# Patient Record
Sex: FEMALE | Race: WHITE | NOT HISPANIC OR LATINO | Employment: OTHER | URBAN - METROPOLITAN AREA
[De-identification: names, ages, dates, MRNs, and addresses within clinical notes are randomized per-mention and may not be internally consistent; named-entity substitution may affect disease eponyms.]

---

## 2017-03-31 ENCOUNTER — PREPPED CHART (OUTPATIENT)
Dept: URBAN - METROPOLITAN AREA CLINIC 43 | Facility: CLINIC | Age: 77
End: 2017-03-31

## 2018-03-30 ENCOUNTER — ESTABLISHED COMPREHENSIVE EXAM (OUTPATIENT)
Dept: URBAN - METROPOLITAN AREA CLINIC 43 | Facility: CLINIC | Age: 78
End: 2018-03-30

## 2018-03-30 DIAGNOSIS — H04.123: ICD-10-CM

## 2018-03-30 DIAGNOSIS — Z96.1: ICD-10-CM

## 2018-03-30 PROCEDURE — 1036F TOBACCO NON-USER: CPT

## 2018-03-30 PROCEDURE — 92014 COMPRE OPH EXAM EST PT 1/>: CPT

## 2018-03-30 PROCEDURE — G8756 NO BP MEASURE DOC: HCPCS

## 2018-03-30 PROCEDURE — G8427 DOCREV CUR MEDS BY ELIG CLIN: HCPCS

## 2018-03-30 PROCEDURE — 92015 DETERMINE REFRACTIVE STATE: CPT

## 2018-03-30 ASSESSMENT — TONOMETRY
OD_IOP_MMHG: 18
OS_IOP_MMHG: 18

## 2018-03-30 ASSESSMENT — VISUAL ACUITY
OD_SC: J1
OD_CC: 20/30+2
OS_CC: 20/30
OS_SC: 20/80-1
OS_SC: J1
OD_SC: 20/70

## 2018-04-06 NOTE — PATIENT DISCUSSION
(T15.02XA) Foreign body in cornea, left eye, initial encounter - Assesment : Examination revealed foreign body OS - Plan : Foreign body removed at slit lamp today with 25 guage and  CTA, without incident. Verbal consent obtained. Continue eymycin TID OS sparingly and OD. If brenda bothers vision by Sunday may stop. Advised patient to call our office with decreased vision or increased symptoms.   RTC 1 week  follow up

## 2018-04-12 NOTE — PATIENT DISCUSSION
(T15.02XD) Foreign body in cornea, left eye, subsequent encounter - Assesment : Examination revealed foreign body OS-healing well, no signs of infection - Plan : Continue to monitor for changes, advised patient that scar will be present in eye.  Stressed importance of safety glasses Continue Emycin SOLEDAD QHS through out the weekend then D/C RV PRN

## 2019-03-26 ENCOUNTER — ESTABLISHED COMPREHENSIVE EXAM (OUTPATIENT)
Dept: URBAN - METROPOLITAN AREA CLINIC 43 | Facility: CLINIC | Age: 79
End: 2019-03-26

## 2019-03-26 DIAGNOSIS — H49.11: ICD-10-CM

## 2019-03-26 DIAGNOSIS — Z96.1: ICD-10-CM

## 2019-03-26 DIAGNOSIS — H04.123: ICD-10-CM

## 2019-03-26 PROCEDURE — 92014 COMPRE OPH EXAM EST PT 1/>: CPT

## 2019-03-26 PROCEDURE — 92015 DETERMINE REFRACTIVE STATE: CPT

## 2019-03-26 ASSESSMENT — VISUAL ACUITY
OS_CC: J1-
OS_SC: 20/100
OD_CC: J2
OD_SC: 20/100
OS_CC: 20/20-2
OD_CC: 20/20-1
OD_SC: J1
OS_SC: J1

## 2019-03-26 ASSESSMENT — TONOMETRY
OS_IOP_MMHG: 18
OD_IOP_MMHG: 18

## 2021-11-22 ENCOUNTER — ESTABLISHED COMPREHENSIVE EXAM (OUTPATIENT)
Dept: URBAN - METROPOLITAN AREA CLINIC 43 | Facility: CLINIC | Age: 81
End: 2021-11-22

## 2021-11-22 DIAGNOSIS — H52.203: ICD-10-CM

## 2021-11-22 DIAGNOSIS — H04.123: ICD-10-CM

## 2021-11-22 DIAGNOSIS — H49.11: ICD-10-CM

## 2021-11-22 DIAGNOSIS — H52.4: ICD-10-CM

## 2021-11-22 PROCEDURE — 92015 DETERMINE REFRACTIVE STATE: CPT

## 2021-11-22 PROCEDURE — 92014 COMPRE OPH EXAM EST PT 1/>: CPT

## 2021-11-22 ASSESSMENT — VISUAL ACUITY
OS_CC: 20/20-1
OS_SC: J1
OD_CC: J1
OS_CC: J1
OD_SC: J1
OD_CC: 20/20-1
OD_SC: 20/80-1
OS_SC: 20/100

## 2021-11-22 ASSESSMENT — TONOMETRY
OD_IOP_MMHG: 15
OS_IOP_MMHG: 13

## 2023-11-17 ENCOUNTER — COMPREHENSIVE EXAM (OUTPATIENT)
Dept: URBAN - METROPOLITAN AREA CLINIC 43 | Facility: CLINIC | Age: 83
End: 2023-11-17

## 2023-11-17 DIAGNOSIS — H04.123: ICD-10-CM

## 2023-11-17 DIAGNOSIS — H49.11: ICD-10-CM

## 2023-11-17 DIAGNOSIS — H52.4: ICD-10-CM

## 2023-11-17 DIAGNOSIS — H26.493: ICD-10-CM

## 2023-11-17 DIAGNOSIS — H52.203: ICD-10-CM

## 2023-11-17 PROCEDURE — 99199RSP RESIDENT SUPERVISED BY PROVIDER

## 2023-11-17 PROCEDURE — 92015 DETERMINE REFRACTIVE STATE: CPT

## 2023-11-17 PROCEDURE — 92014 COMPRE OPH EXAM EST PT 1/>: CPT

## 2023-11-17 ASSESSMENT — VISUAL ACUITY
OS_SC: 20/80
OS_SC: J1
OD_CC: 20/30
OD_SC: 20/100
OD_PH: 20/20-
OD_SC: J1
OS_CC: 20/20-2

## 2023-11-17 ASSESSMENT — TONOMETRY
OD_IOP_MMHG: 16
OS_IOP_MMHG: 16

## 2024-12-03 ENCOUNTER — COMPREHENSIVE EXAM (OUTPATIENT)
Age: 84
End: 2024-12-03

## 2024-12-03 DIAGNOSIS — H49.11: ICD-10-CM

## 2024-12-03 DIAGNOSIS — H26.493: ICD-10-CM

## 2024-12-03 DIAGNOSIS — H52.203: ICD-10-CM

## 2024-12-03 DIAGNOSIS — H04.123: ICD-10-CM

## 2024-12-03 PROCEDURE — 92015 DETERMINE REFRACTIVE STATE: CPT

## 2024-12-03 PROCEDURE — 92014 COMPRE OPH EXAM EST PT 1/>: CPT
